# Patient Record
Sex: FEMALE | Employment: FULL TIME | URBAN - METROPOLITAN AREA
[De-identification: names, ages, dates, MRNs, and addresses within clinical notes are randomized per-mention and may not be internally consistent; named-entity substitution may affect disease eponyms.]

---

## 2022-08-08 ENCOUNTER — TELEPHONE (OUTPATIENT)
Dept: ORTHOPEDICS | Facility: CLINIC | Age: 47
End: 2022-08-08
Payer: COMMERCIAL

## 2022-08-08 ENCOUNTER — PATIENT MESSAGE (OUTPATIENT)
Dept: ORTHOPEDICS | Facility: CLINIC | Age: 47
End: 2022-08-08
Payer: COMMERCIAL

## 2022-08-08 NOTE — TELEPHONE ENCOUNTER
Contacted patient - reviewed work flow for our program. Advised I will hold off on requesting records until she reached weight goal.  Will go ahead and request images ([facility] [address], [place])  Recommended patient get accurate height for better determination of weight goal.  Pt will contact carrier RN and PCP regarding weight loss options. We did discuss santino zurdo sx program and I did advise her that if she proceeds with that, she would have to wait 12 months post op to have joint replacement. Advised she take some time to decide what she would like to do as currently she has 50 lbs to lose.  Patient states she's already lost 20lb through diet changes over recent months.    Confirmed R knee, no nicotine use in last 12 months, not diabetic.      Tentative measurements -  60 in/252# - would need to be 200lbs to qualify if height is correct - we discussed different weight goals based on other heights.    Portal message sent to patient with my contact information. Will be in touch to check on her progress. She is free to contact me at any time with questions or updates.

## 2022-11-28 ENCOUNTER — TELEPHONE (OUTPATIENT)
Dept: ORTHOPEDICS | Facility: CLINIC | Age: 47
End: 2022-11-28
Payer: COMMERCIAL

## 2022-11-28 NOTE — TELEPHONE ENCOUNTER
Called patient for status update - has lost about half of weight. Recently started Mounjaro injections weekly for weightloss.  Patient reports she's focusing on better eating habits.

## 2023-02-20 ENCOUNTER — TELEPHONE (OUTPATIENT)
Dept: ORTHOPEDICS | Facility: CLINIC | Age: 48
End: 2023-02-20
Payer: COMMERCIAL

## 2023-02-20 NOTE — TELEPHONE ENCOUNTER
Attempted to call pt for update re: weight loss for joint replacement surgery. Left voicemail with callback #.

## 2023-03-15 ENCOUNTER — TELEPHONE (OUTPATIENT)
Dept: ORTHOPEDICS | Facility: CLINIC | Age: 48
End: 2023-03-15
Payer: COMMERCIAL

## 2023-03-20 ENCOUNTER — TELEPHONE (OUTPATIENT)
Dept: ORTHOPEDICS | Facility: CLINIC | Age: 48
End: 2023-03-20
Payer: COMMERCIAL

## 2023-03-20 NOTE — TELEPHONE ENCOUNTER
Called pt per Maritza's request. Pt states she only did Mounjaro injections x 1 month due to being non-diabetic and insurance not covering. States weight in February was 241. Goal weight is 200. Pt's height is 60 in. Pt states she is doing high protein, low carb diet to lose weight. Pt thinks she can reach her goal weight by May or June 2023. Encouraged pt to weigh in at least once/month, weekly would be ideal. Pt verbalized understanding.

## 2023-03-27 ENCOUNTER — TELEPHONE (OUTPATIENT)
Dept: ORTHOPEDICS | Facility: CLINIC | Age: 48
End: 2023-03-27
Payer: COMMERCIAL

## 2023-03-27 NOTE — TELEPHONE ENCOUNTER
Attempted to reach patient about mounjaro pharm savings card from [employer] - no answer; left vcm with cb#   29-Aug-2017

## 2023-05-15 ENCOUNTER — TELEPHONE (OUTPATIENT)
Dept: ORTHOPEDICS | Facility: CLINIC | Age: 48
End: 2023-05-15
Payer: COMMERCIAL

## 2023-05-15 NOTE — TELEPHONE ENCOUNTER
Called patient for status update.   Has had some family issues going on.  Has not met BMI goal yet.